# Patient Record
Sex: MALE | Race: WHITE | URBAN - METROPOLITAN AREA
[De-identification: names, ages, dates, MRNs, and addresses within clinical notes are randomized per-mention and may not be internally consistent; named-entity substitution may affect disease eponyms.]

---

## 2024-11-20 ENCOUNTER — OFFICE VISIT (OUTPATIENT)
Dept: URGENT CARE | Age: 34
End: 2024-11-20
Payer: COMMERCIAL

## 2024-11-20 VITALS
OXYGEN SATURATION: 98 % | HEART RATE: 81 BPM | TEMPERATURE: 97 F | RESPIRATION RATE: 20 BRPM | DIASTOLIC BLOOD PRESSURE: 85 MMHG | SYSTOLIC BLOOD PRESSURE: 148 MMHG
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
## 2024-11-20 RX ORDER — DOXYCYCLINE 100 MG/1
100 CAPSULE ORAL 2 TIMES DAILY
Qty: 20 CAPSULE | Refills: 0 | Status: SHIPPED | OUTPATIENT
Start: 2024-11-20 | End: 2024-11-30

## 2024-11-20 NOTE — PROGRESS NOTES
Subjective   Patient ID: Anastacio Sierra is a 34 y.o. male. They present today with a chief complaint of tick bite    Patient disposition: Home    HISTORY OF PRESENT ILLNESS:    [facility] adult presents for tick bite. He hunts in the woods and thinks he was bitten 5d ago. Found tick on R buttock 2d ago with red patch around it. Wife pulled off dead tick which was tiny. Denies sx. Tetanus UTD. States gets bitten by ticks all the time. Has no PCP.      Past Medical History  Allergies as of 11/20/2024    (No Known Allergies)       (Not in a hospital admission)       No past medical history on file.    No past surgical history on file.         Review of Systems    Negative except as documented in the History of Present Illness.                             Objective    Vitals:    11/20/24 0907   BP: 148/85   Pulse: 81   Resp: 20   Temp: 36.1 °C (97 °F)   TempSrc: Temporal   SpO2: 98%     No LMP for male patient.      PHYSICAL EXAMINATION:      Pt declined chaperone    CONSTITUTIONAL: well-appearing, nontoxic    EYES:   No scleral icterus or orbital trauma noted. No conjunctival injection.     ENT:  Head and face are unremarkable and atraumatic. Mucous membranes moist.     LUNGS:  No respiratory distress noted. No coughing noted.    CARDIOVASCULAR:   Well-perfused.    ABDOMEN:  Nonobese, nondistended     MUSCULOSKELETAL: TAPIA with equal strength. Gait [observed to be normal.]    SKIN:   There is a small subcentimeter lesion of erythema/ecchymosis on the R buttock. There is no bullseye rash and no cellulitic rash.    NEURO:  Normal baseline mental status. No obvious neurological deficits, normal sensation and strength bilaterally.    PSYCH: Appropriate mood and affect.         ---------------------------------------------------------------         MDM:  TBI present in this area. Tick not available for inspection. Was apparently on pt for 72 hours. Will prophylax with doxycycline. Discussed how he gets bitten by ticks all the time as  abbie talavera and gave referral to PCP for annual examination as well as lyme etc. Screening.        Procedures    Diagnostic study results (if any) were reviewed by Delfino Marshall PA-C.    No results found for this visit on 11/20/24.     Assessment/Plan   Allergies, medications, history, and pertinent labs/EKGs/Imaging reviewed by Delfino Marshall PA-C.     Orders and Diagnoses  Diagnoses and all orders for this visit:  Tick bite, unspecified site, initial encounter  -     doxycycline (Vibramycin) 100 mg capsule; Take 1 capsule (100 mg) by mouth 2 times a day for 10 days. Take with at least 8 ounces (large glass) of water, do not lie down for 30 minutes after  -     Referral to Primary Care; Future      Medical Admin Record      Follow Up Instructions  No follow-ups on file.    Electronically signed by Delfino Marshall PA-C  9:24 AM

## 2025-07-31 ENCOUNTER — OFFICE VISIT (OUTPATIENT)
Dept: URGENT CARE | Age: 35
End: 2025-07-31
Payer: COMMERCIAL

## 2025-07-31 VITALS
HEIGHT: 72 IN | WEIGHT: 195 LBS | DIASTOLIC BLOOD PRESSURE: 96 MMHG | SYSTOLIC BLOOD PRESSURE: 135 MMHG | BODY MASS INDEX: 26.41 KG/M2 | TEMPERATURE: 97.9 F | RESPIRATION RATE: 18 BRPM | HEART RATE: 73 BPM | OXYGEN SATURATION: 99 %

## 2025-07-31 DIAGNOSIS — R09.81 SINUS CONGESTION: ICD-10-CM

## 2025-07-31 LAB
POC CORONAVIRUS SARS-COV-2 PCR: NEGATIVE
POC HUMAN RHINOVIRUS PCR: POSITIVE
POC INFLUENZA A VIRUS PCR: NEGATIVE
POC INFLUENZA B VIRUS PCR: NEGATIVE
POC RESPIRATORY SYNCYTIAL VIRUS PCR: NEGATIVE

## 2025-07-31 PROCEDURE — 99213 OFFICE O/P EST LOW 20 MIN: CPT | Performed by: PHYSICIAN ASSISTANT

## 2025-07-31 PROCEDURE — 3008F BODY MASS INDEX DOCD: CPT | Performed by: PHYSICIAN ASSISTANT

## 2025-07-31 PROCEDURE — 87631 RESP VIRUS 3-5 TARGETS: CPT | Performed by: PHYSICIAN ASSISTANT

## 2025-07-31 ASSESSMENT — PAIN SCALES - GENERAL: PAINLEVEL_OUTOF10: 0-NO PAIN

## 2025-07-31 NOTE — PROGRESS NOTES
Subjective   Patient ID: Anastacio Sierra is a 35 y.o. male. They present today with a chief complaint of Sinusitis.    Patient disposition: Home    HISTORY OF PRESENT ILLNESS:    This is an adult male with a PMH of infrequent sinusitis. He presents for 2 wks of sinusitis sx including thick colored mucus drainage, facial pressure, dry cough, malaise. Sx unimproved in 2 wks. Denies SOB, CP, f/c/s, HA. States children were diagnosed with rhinovirus recently.        Past Medical History  Allergies as of 07/31/2025    (No Known Allergies)       Prescriptions Prior to Admission[1]     Medical History[2]    Surgical History[3]     reports that he has never smoked. He has never used smokeless tobacco.    Review of Systems    Negative except as documented in the History of Present Illness.                             Objective    There were no vitals filed for this visit.  No LMP for male patient.      PHYSICAL EXAMINATION:    CONSTITUTIONAL: well-appearing, nontoxic         ENT:  Head and face are unremarkable and atraumatic. Mucous membranes moist.    Pan sinus TTP present    * Oropharynx nl. Airway patent.    * No uvular deviation. No visible abscess.    * Lymphadenopathy absent.    * TMs nl bl.         LUNGS:  CTAB, no r/r/w. No increased WOB.    CARDIOVASCULAR:   RRR, no m/r/g. Nl S1/S2.    ABDOMEN:  Nontender including left upper quadrant, nondistended, no acute abdomen.     MUSCULOSKELETAL: No obvious deformities. TAPIA with equal strength. Gait normal.    SKIN:   Warm and dry with no rashes.    NEURO:  Normal baseline mental status.    PSYCH: Appropriate mood and affect.         ------------------------------------------         MDM: Ddx included AVRS, ABRS, seasonal allergic rhinitis, acute bronchitis.   Rapid PCR testing positive for rhinovirus.  Advised Mucinex-D as needed for symptoms. Will fu here PRN if unresolved.        Procedures    Diagnostic study results (if any) were reviewed by Delfino Marshall PA-C.    No  results found for this visit on 07/31/25.     Assessment/Plan   Allergies, medications, history, and pertinent labs/EKGs/Imaging reviewed by Delfino Marshall PA-C.     Orders and Diagnoses  There are no diagnoses linked to this encounter.    Medical Admin Record      Follow Up Instructions  No follow-ups on file.    Electronically signed by Delfino Marshall PA-C  10:48 AM         [1] (Not in a hospital admission)   [2] History reviewed. No pertinent past medical history.  [3] History reviewed. No pertinent surgical history.